# Patient Record
Sex: FEMALE | Race: WHITE | Employment: UNEMPLOYED | URBAN - NONMETROPOLITAN AREA
[De-identification: names, ages, dates, MRNs, and addresses within clinical notes are randomized per-mention and may not be internally consistent; named-entity substitution may affect disease eponyms.]

---

## 2018-06-05 ENCOUNTER — HOSPITAL ENCOUNTER (OUTPATIENT)
Dept: WOMENS IMAGING | Age: 73
Discharge: HOME | End: 2018-06-05
Payer: MEDICARE

## 2018-06-05 DIAGNOSIS — R92.8 ABNORMAL MAMMOGRAM: ICD-10-CM

## 2018-06-05 PROCEDURE — 77063 BREAST TOMOSYNTHESIS BI: CPT

## 2019-06-06 ENCOUNTER — HOSPITAL ENCOUNTER (OUTPATIENT)
Dept: WOMENS IMAGING | Age: 74
Discharge: HOME | End: 2019-06-06
Payer: MEDICARE

## 2019-06-06 DIAGNOSIS — Z12.31 VISIT FOR SCREENING MAMMOGRAM: ICD-10-CM

## 2019-06-06 PROCEDURE — 77063 BREAST TOMOSYNTHESIS BI: CPT

## 2019-08-29 LAB — C-REACTIVE PROTEIN: 0.3

## 2019-09-20 LAB
BUN BLDV-MCNC: 28 MG/DL
CALCIUM SERPL-MCNC: 10.5 MG/DL
CHLORIDE BLD-SCNC: 106 MMOL/L
CO2: 26 MMOL/L
CREAT SERPL-MCNC: 1.34 MG/DL
GFR CALCULATED: 39
GLUCOSE BLD-MCNC: 110 MG/DL
MAGNESIUM: 1.7 MG/DL
POTASSIUM SERPL-SCNC: 4.2 MMOL/L
SODIUM BLD-SCNC: 141 MMOL/L

## 2019-10-21 LAB
AVERAGE GLUCOSE: 140
CREATININE, URINE: 172
HBA1C MFR BLD: 6.5 %
MICROALBUMIN/CREAT 24H UR: 51.4 MG/G{CREAT}
MICROALBUMIN/CREAT UR-RTO: 30

## 2020-11-10 LAB
CREATININE, URINE: 263
MICROALBUMIN/CREAT 24H UR: 37.6 MG/G{CREAT}
MICROALBUMIN/CREAT UR-RTO: 14

## 2020-11-18 LAB
ALP BLD-CCNC: 2.5 U/L
AVERAGE GLUCOSE: NORMAL
BASOPHILS ABSOLUTE: NORMAL
BASOPHILS RELATIVE PERCENT: NORMAL
EOSINOPHILS ABSOLUTE: NORMAL
EOSINOPHILS RELATIVE PERCENT: NORMAL
HBA1C MFR BLD: 7.2 %
HCT VFR BLD CALC: 42 % (ref 36–46)
HEMOGLOBIN: 13.5 G/DL (ref 12–16)
LYMPHOCYTES ABSOLUTE: NORMAL
LYMPHOCYTES RELATIVE PERCENT: NORMAL
MCH RBC QN AUTO: NORMAL PG
MCHC RBC AUTO-ENTMCNC: NORMAL G/DL
MCV RBC AUTO: NORMAL FL
MONOCYTES ABSOLUTE: NORMAL
MONOCYTES RELATIVE PERCENT: NORMAL
NEUTROPHILS ABSOLUTE: NORMAL
NEUTROPHILS RELATIVE PERCENT: NORMAL
PLATELET # BLD: 258 K/ΜL
PMV BLD AUTO: NORMAL FL
PTH INTACT: 137
RBC # BLD: 4.82 10^6/ΜL
TSH SERPL DL<=0.05 MIU/L-ACNC: 0.76 UIU/ML
WBC # BLD: 6.1 10^3/ML

## 2020-11-24 LAB
CALCIUM SERPL-MCNC: 5.6 MG/DL
VITAMIN D 25-HYDROXY: 36
VITAMIN D2, 25 HYDROXY: NORMAL
VITAMIN D3,25 HYDROXY: NORMAL

## 2020-12-08 LAB
BUN BLDV-MCNC: 23 MG/DL
CALCIUM SERPL-MCNC: 9.9 MG/DL
CHLORIDE BLD-SCNC: 106 MMOL/L
CO2: 26 MMOL/L
CREAT SERPL-MCNC: 1.59 MG/DL
GFR CALCULATED: 32
GLUCOSE BLD-MCNC: 105 MG/DL
POTASSIUM SERPL-SCNC: 4.5 MMOL/L
SODIUM BLD-SCNC: 143 MMOL/L
VITAMIN D 25-HYDROXY: 63
VITAMIN D2, 25 HYDROXY: <8
VITAMIN D3,25 HYDROXY: 63

## 2021-01-21 PROBLEM — E11.9 DIABETES MELLITUS TYPE II, NON INSULIN DEPENDENT (HCC): Status: ACTIVE | Noted: 2021-01-21

## 2021-01-21 PROBLEM — E66.9 OBESITY, CLASS II, BMI 35-39.9: Status: ACTIVE | Noted: 2018-12-13

## 2021-01-21 PROBLEM — S46.012A ROTATOR CUFF STRAIN, LEFT, INITIAL ENCOUNTER: Status: ACTIVE | Noted: 2020-04-13

## 2021-01-21 PROBLEM — K21.9 GERD (GASTROESOPHAGEAL REFLUX DISEASE): Status: ACTIVE | Noted: 2021-01-21

## 2021-01-21 PROBLEM — N17.9 AKI (ACUTE KIDNEY INJURY) (HCC): Status: ACTIVE | Noted: 2019-04-08

## 2021-01-21 PROBLEM — E78.2 MIXED HYPERLIPIDEMIA: Status: ACTIVE | Noted: 2017-04-29

## 2021-01-21 PROBLEM — I49.9 ARRHYTHMIA: Status: ACTIVE | Noted: 2021-01-21

## 2021-01-21 PROBLEM — G47.00 INSOMNIA: Status: ACTIVE | Noted: 2021-01-21

## 2021-01-21 PROBLEM — E66.812 OBESITY, CLASS II, BMI 35-39.9: Status: ACTIVE | Noted: 2018-12-13

## 2021-01-21 PROBLEM — I10 ESSENTIAL HYPERTENSION, BENIGN: Status: ACTIVE | Noted: 2017-04-29

## 2021-01-21 PROBLEM — L30.9 ECZEMA OF BOTH HANDS: Status: ACTIVE | Noted: 2018-09-20

## 2021-01-21 PROBLEM — M19.90 OSTEOARTHROSIS: Status: ACTIVE | Noted: 2021-01-21

## 2021-01-21 PROBLEM — N28.9 RENAL IMPAIRMENT: Status: ACTIVE | Noted: 2021-01-21

## 2021-01-21 PROBLEM — F32.A DEPRESSION: Status: ACTIVE | Noted: 2018-03-27

## 2021-01-21 PROBLEM — J45.30 MILD PERSISTENT ASTHMA WITHOUT COMPLICATION: Status: ACTIVE | Noted: 2017-04-29

## 2021-01-21 RX ORDER — MONTELUKAST SODIUM 10 MG/1
TABLET ORAL
COMMUNITY
Start: 2020-11-10 | End: 2021-01-26 | Stop reason: SDUPTHER

## 2021-01-21 RX ORDER — ROSUVASTATIN CALCIUM 5 MG/1
TABLET, COATED ORAL
COMMUNITY
Start: 2020-11-10 | End: 2021-01-26 | Stop reason: SDUPTHER

## 2021-01-21 RX ORDER — OMEPRAZOLE 40 MG/1
40 CAPSULE, DELAYED RELEASE ORAL EVERY MORNING
COMMUNITY
Start: 2020-06-11

## 2021-01-21 RX ORDER — SENNOSIDES 8.6 MG
650 CAPSULE ORAL 2 TIMES DAILY
COMMUNITY

## 2021-01-21 RX ORDER — CHOLECALCIFEROL (VITAMIN D3) 125 MCG
10 CAPSULE ORAL DAILY
COMMUNITY

## 2021-01-21 RX ORDER — FLUOXETINE HYDROCHLORIDE 20 MG/1
CAPSULE ORAL
COMMUNITY
Start: 2020-11-10 | End: 2021-01-26 | Stop reason: SDUPTHER

## 2021-01-21 RX ORDER — LORATADINE 10 MG/1
10 TABLET ORAL DAILY
COMMUNITY

## 2021-01-21 RX ORDER — MONTELUKAST SODIUM 10 MG/1
10 TABLET ORAL DAILY
COMMUNITY
Start: 2020-11-10

## 2021-01-21 RX ORDER — ROSUVASTATIN CALCIUM 5 MG/1
5 TABLET, COATED ORAL DAILY
COMMUNITY
Start: 2020-11-10

## 2021-01-21 RX ORDER — ASPIRIN 81 MG/1
81 TABLET ORAL DAILY
COMMUNITY

## 2021-01-21 RX ORDER — METOPROLOL TARTRATE 50 MG/1
50 TABLET, FILM COATED ORAL 2 TIMES DAILY
COMMUNITY
Start: 2020-11-03

## 2021-01-21 RX ORDER — FLUOXETINE HYDROCHLORIDE 20 MG/1
20 CAPSULE ORAL DAILY
COMMUNITY
Start: 2020-11-10

## 2021-01-21 RX ORDER — MELOXICAM 7.5 MG/1
7.5 TABLET ORAL DAILY
COMMUNITY
Start: 2020-04-07 | End: 2021-04-27 | Stop reason: ALTCHOICE

## 2021-01-26 ENCOUNTER — OFFICE VISIT (OUTPATIENT)
Dept: NEPHROLOGY | Age: 76
End: 2021-01-26
Payer: MEDICARE

## 2021-01-26 VITALS
SYSTOLIC BLOOD PRESSURE: 125 MMHG | OXYGEN SATURATION: 97 % | WEIGHT: 192 LBS | DIASTOLIC BLOOD PRESSURE: 69 MMHG | HEART RATE: 80 BPM | BODY MASS INDEX: 32.78 KG/M2 | HEIGHT: 64 IN

## 2021-01-26 DIAGNOSIS — E83.52 HYPERCALCEMIA: ICD-10-CM

## 2021-01-26 DIAGNOSIS — N18.32 STAGE 3B CHRONIC KIDNEY DISEASE (HCC): Primary | ICD-10-CM

## 2021-01-26 PROCEDURE — 99204 OFFICE O/P NEW MOD 45 MIN: CPT | Performed by: INTERNAL MEDICINE

## 2021-01-26 NOTE — PATIENT INSTRUCTIONS
You can try over the counter Pepcid 20 mg daily in place of Omeprazole for heartburn. Limit use of Meloxicam and other NSAIDS (Motrin, Advil, Aleve, Ibuprofen, Naproxyn). Take Tylenol for pain. Avoid calcium supplements and Vitamin D. Increase water intake to at least 4 glasses, more if you can. Obtain 24 hour urine collection  Kidney ultrasound  Nuclear scan of parathyroid glands.

## 2021-01-26 NOTE — PROGRESS NOTES
activity: Not on file   Lifestyle    Physical activity     Days per week: Not on file     Minutes per session: Not on file    Stress: Not on file   Relationships    Social connections     Talks on phone: Not on file     Gets together: Not on file     Attends Rastafarian service: Not on file     Active member of club or organization: Not on file     Attends meetings of clubs or organizations: Not on file     Relationship status: Not on file    Intimate partner violence     Fear of current or ex partner: Not on file     Emotionally abused: Not on file     Physically abused: Not on file     Forced sexual activity: Not on file   Other Topics Concern    Not on file   Social History Narrative    Not on file        Review of Systems:  Constitutional: no fever or chills +fatigue +hot flashes  Head: No headaches  Eyes: no blurry vision, no discharge  Ears: no ear pain or hearing changes  Nose: no runny nose or epistaxis  Respiratory: no shortness of breath or cough or sputum production  Cardiovascular: no chest pain, no edema  GI: no nausea, no vomiting or diarrhea +heartburn  : denies any hematuria, no flank pain  Skin: no rash  Musculoskeletal: no joint pain, moves all ext  Neuro: no tremor, no slurred speech  Psychiatric: stable mood, no depression or insomnia     Home Medications:  Prior to Admission medications    Medication Sig Start Date End Date Taking?  Authorizing Provider   Cyanocobalamin 100 MCG LOZG Take 5,000 mcg by mouth daily   Yes Historical Provider, MD   acetaminophen (TYLENOL) 650 MG extended release tablet Take 650 mg by mouth 2 times daily   Yes Historical Provider, MD   aspirin 81 MG EC tablet Take 81 mg by mouth daily   Yes Historical Provider, MD   diphenhydrAMINE (SOMINEX) 25 MG tablet Take 25 mg by mouth every 6 hours as needed   Yes Historical Provider, MD   FLUoxetine (PROZAC) 20 MG capsule Take 20 mg by mouth daily 11/10/20  Yes Historical Provider, MD   loratadine (CLARITIN) 10 MG tablet Results   Component Value Date    WBC 6.1 11/18/2020    HGB 13.5 11/18/2020    HCT 42.0 11/18/2020     11/18/2020     BMP:    Lab Results   Component Value Date     12/08/2020     09/20/2019    K 4.5 12/08/2020    K 4.2 09/20/2019     12/08/2020     09/20/2019    CO2 26 12/08/2020    CO2 26 09/20/2019    BUN 23 12/08/2020    BUN 28 09/20/2019    CREATININE 1.59 12/08/2020    CREATININE 1.34 09/20/2019    GLUCOSE 105 12/08/2020    GLUCOSE 110 09/20/2019      Hepatic:   Lab Results   Component Value Date    ALKPHOS 2.5 11/18/2020     BNP: No results found for: BNP  Lipids: No results found for: CHOL, HDL  INR: No results found for: INR  URINE: No results found for: NAUR, PROTUR  No results found for: NITRU, COLORU, PHUR, LABCAST, WBCUA, RBCUA, MUCUS, TRICHOMONAS, YEAST, BACTERIA, CLARITYU, SPECGRAV, LEUKOCYTESUR, UROBILINOGEN, BILIRUBINUR, BLOODU, GLUCOSEU, KETUA, AMORPHOUS   Microalbumen/Creatinine ratio:  No components found for: RUCREAT        Impression/Plan:   1. CKD III likely from DM, NSAIDs, hypercalcemia +/- PPI. Obtain renal US. No significant proteinuria. Advised pepcid instead of PPI If able. Limit NSAIDs but doesn't take regularly. Increase water intake. 2. Hypercalcemia suspect primary hyperparathyroidism. Will obtain 24 hour urine calcium as well as nuclear parathyroid scan. Avoid Ca/Vit D. Last Ca was 9.9 so we will just monitor for now but if worsens will consider surgery. 3. DM  4. HTN  5. GERD    Return in 3 months. Thought process was discussed with the patient.   Thank you for the referral.  Please do not hesitate to contact me if you have any questions or concerns        Dora Page, DO  [facility]

## 2021-01-29 LAB
CALCIUM 24 HOUR URINE: 215
CREATINE 24 HOUR URINE: 0.9
CREATININE 24 HOUR UR: NORMAL
SODIUM 24 HOUR URINE: 168
URINE VOLUME, 24 HOUR: NORMAL

## 2021-04-20 LAB
BUN BLDV-MCNC: 23 MG/DL
CALCIUM SERPL-MCNC: 10.6 MG/DL
CHLORIDE BLD-SCNC: 109 MMOL/L
CO2: 25 MMOL/L
CREAT SERPL-MCNC: 1.2 MG/DL
GFR CALCULATED: 44
GLUCOSE BLD-MCNC: 127 MG/DL
POTASSIUM SERPL-SCNC: 4.6 MMOL/L
PTH INTACT: 141
SODIUM BLD-SCNC: 141 MMOL/L

## 2021-04-27 ENCOUNTER — OFFICE VISIT (OUTPATIENT)
Dept: NEPHROLOGY | Age: 76
End: 2021-04-27
Payer: MEDICARE

## 2021-04-27 VITALS
DIASTOLIC BLOOD PRESSURE: 56 MMHG | SYSTOLIC BLOOD PRESSURE: 117 MMHG | OXYGEN SATURATION: 98 % | HEART RATE: 83 BPM | WEIGHT: 187 LBS | BODY MASS INDEX: 32.1 KG/M2

## 2021-04-27 DIAGNOSIS — E21.3 HYPERPARATHYROIDISM (HCC): ICD-10-CM

## 2021-04-27 DIAGNOSIS — N18.32 STAGE 3B CHRONIC KIDNEY DISEASE (HCC): Primary | ICD-10-CM

## 2021-04-27 DIAGNOSIS — E83.52 HYPERCALCEMIA: ICD-10-CM

## 2021-04-27 PROCEDURE — 99213 OFFICE O/P EST LOW 20 MIN: CPT | Performed by: INTERNAL MEDICINE

## 2021-04-27 NOTE — PROGRESS NOTES
Patient will call office with updated medication list some things have changed.
mg by mouth 2 times daily      aspirin 81 MG EC tablet Take 81 mg by mouth daily      diphenhydrAMINE (SOMINEX) 25 MG tablet Take 25 mg by mouth every 6 hours as needed      FLUoxetine (PROZAC) 20 MG capsule Take 20 mg by mouth daily      loratadine (CLARITIN) 10 MG tablet Take 10 mg by mouth daily      melatonin 5 MG TABS tablet Take 10 mg by mouth daily       metFORMIN (GLUCOPHAGE) 500 MG tablet Take 500 mg by mouth 2 times daily      metoprolol tartrate (LOPRESSOR) 50 MG tablet       mometasone-formoterol (DULERA) 100-5 MCG/ACT inhaler Inhale 2 puffs into the lungs 2 times daily      DULERA 100-5 MCG/ACT inhaler       montelukast (SINGULAIR) 10 MG tablet Take 10 mg by mouth daily      omeprazole (PRILOSEC) 40 MG delayed release capsule Take 40 mg by mouth every morning      rosuvastatin (CRESTOR) 5 MG tablet Take 5 mg by mouth daily       No current facility-administered medications for this visit.          Laboratory & Diagnostics:  CBC:   Lab Results   Component Value Date    WBC 6.1 11/18/2020    HGB 13.5 11/18/2020    HCT 42.0 11/18/2020     11/18/2020     BMP:    Lab Results   Component Value Date     04/20/2021     12/08/2020     09/20/2019    K 4.6 04/20/2021    K 4.5 12/08/2020    K 4.2 09/20/2019     04/20/2021     12/08/2020     09/20/2019    CO2 25 04/20/2021    CO2 26 12/08/2020    CO2 26 09/20/2019    BUN 23 04/20/2021    BUN 23 12/08/2020    BUN 28 09/20/2019    CREATININE 1.20 04/20/2021    CREATININE 1.59 12/08/2020    CREATININE 1.34 09/20/2019    GLUCOSE 127 04/20/2021    GLUCOSE 105 12/08/2020    GLUCOSE 110 09/20/2019      Hepatic:   Lab Results   Component Value Date    ALKPHOS 2.5 11/18/2020     BNP: No results found for: BNP  Lipids: No results found for: CHOL, HDL  INR: No results found for: INR  URINE: No results found for: NAUR, PROTUR  No results found for: NITRU, COLORU, PHUR, LABCAST, WBCUA, RBCUA, MUCUS, TRICHOMONAS, YEAST,

## 2021-05-07 ENCOUNTER — HOSPITAL ENCOUNTER (OUTPATIENT)
Dept: WOMENS IMAGING | Age: 76
Discharge: HOME | End: 2021-05-07
Payer: MEDICARE

## 2021-05-07 DIAGNOSIS — Z12.31 VISIT FOR SCREENING MAMMOGRAM: ICD-10-CM

## 2021-05-07 PROCEDURE — 77063 BREAST TOMOSYNTHESIS BI: CPT

## 2021-10-21 LAB — PTH INTACT: 112

## 2021-10-26 ENCOUNTER — OFFICE VISIT (OUTPATIENT)
Dept: NEPHROLOGY | Age: 76
End: 2021-10-26
Payer: MEDICARE

## 2021-10-26 VITALS
OXYGEN SATURATION: 97 % | SYSTOLIC BLOOD PRESSURE: 110 MMHG | BODY MASS INDEX: 32.44 KG/M2 | HEART RATE: 92 BPM | DIASTOLIC BLOOD PRESSURE: 67 MMHG | WEIGHT: 189 LBS

## 2021-10-26 DIAGNOSIS — N18.32 STAGE 3B CHRONIC KIDNEY DISEASE (HCC): Primary | ICD-10-CM

## 2021-10-26 PROCEDURE — 99214 OFFICE O/P EST MOD 30 MIN: CPT | Performed by: INTERNAL MEDICINE

## 2021-10-26 RX ORDER — LOSARTAN POTASSIUM 25 MG/1
25 TABLET ORAL DAILY
Qty: 90 TABLET | Refills: 3 | Status: SHIPPED | OUTPATIENT
Start: 2021-10-26 | End: 2022-10-10 | Stop reason: SDUPTHER

## 2021-10-26 RX ORDER — FLUCONAZOLE 150 MG/1
150 TABLET ORAL ONCE
Qty: 1 TABLET | Refills: 0 | Status: SHIPPED | OUTPATIENT
Start: 2021-10-26 | End: 2021-10-26

## 2021-10-26 NOTE — PROGRESS NOTES
[facility]  [address] [facility] [address]  Dept: [phone]  Loc: [phone]  Progress Note  10/26/2021 12:26 PM      Pt Name:    Michael Munoz  YOB: 1945  Primary Care Physician:  Melissa Gomez DO     Chief Complaint:   Chief Complaint   Patient presents with    Follow-up     CKD III        History of Present Illness: This is a follow-up visit for CKD III. Hx of DM > 10 years, hypercalcemia, asthma, JACIEL, hyperlipidemia, depression, GERD, calcium oxalate kidney stones. She has hx of THOMAS in the past in Apirl 2019 from sepsis and obstructive uropathy. Previous work up showed PTH high, 24 hour urine calcium is 215 mg. Parathyroid nuclear med scan was negative. Renal US showed atrophic left kidney. Patient doing well. No complaints. BP controlled. No edema. Sugars controlled. She has some vaginal itching that is bothering her. Pertinent items are noted in HPI. Past History:  No past medical history on file. No past surgical history on file. VITALS:  /67 (Site: Right Upper Arm, Position: Sitting, Cuff Size: Large Adult)   Pulse 92   Wt 189 lb (85.7 kg)   SpO2 97%   BMI 32.44 kg/m²   Wt Readings from Last 3 Encounters:   10/26/21 189 lb (85.7 kg)   04/27/21 187 lb (84.8 kg)   01/26/21 192 lb (87.1 kg)     Body mass index is 32.44 kg/m².      General Appearance: alert and cooperative with exam, appears comfortable, no distress  HEENT: EOMI, moist oral mucus membranes  Neck: No jugular venous distention,   Lungs: Air entry B/L, no crackles or rales, no use of accessory muscles  Heart: S1, S2 heard, no rub  GI: soft, non-tender, no guarding,   Extremities: No sig LE edema, no cyanosis  Skin: warm, dry  Neurologic: no tremor, no asterixis, no focal neurologic deficits     Medications:  Current Outpatient Medications   Medication Sig Dispense Refill    Apoaequorin (PREVAGEN) 10 MG CAPS Take by mouth      Cyanocobalamin 100 MCG LOZG Take 5,000 mcg by mouth daily      acetaminophen (TYLENOL) 650 MG extended release tablet Take 650 mg by mouth 2 times daily      aspirin 81 MG EC tablet Take 81 mg by mouth daily      diphenhydrAMINE (SOMINEX) 25 MG tablet Take 25 mg by mouth every 6 hours as needed      FLUoxetine (PROZAC) 20 MG capsule Take 20 mg by mouth daily      loratadine (CLARITIN) 10 MG tablet Take 10 mg by mouth daily      melatonin 5 MG TABS tablet Take 10 mg by mouth daily       metFORMIN (GLUCOPHAGE) 500 MG tablet Take 500 mg by mouth 2 times daily      metoprolol tartrate (LOPRESSOR) 50 MG tablet       mometasone-formoterol (DULERA) 100-5 MCG/ACT inhaler Inhale 2 puffs into the lungs 2 times daily      DULERA 100-5 MCG/ACT inhaler       montelukast (SINGULAIR) 10 MG tablet Take 10 mg by mouth daily      omeprazole (PRILOSEC) 40 MG delayed release capsule Take 40 mg by mouth every morning      rosuvastatin (CRESTOR) 5 MG tablet Take 5 mg by mouth daily       No current facility-administered medications for this visit.         Laboratory & Diagnostics:  CBC:   Lab Results   Component Value Date    WBC 6.1 11/18/2020    HGB 13.5 11/18/2020    HCT 42.0 11/18/2020     11/18/2020     BMP:    Lab Results   Component Value Date     04/20/2021     12/08/2020     09/20/2019    K 4.6 04/20/2021    K 4.5 12/08/2020    K 4.2 09/20/2019     04/20/2021     12/08/2020     09/20/2019    CO2 25 04/20/2021    CO2 26 12/08/2020    CO2 26 09/20/2019    BUN 23 04/20/2021    BUN 23 12/08/2020    BUN 28 09/20/2019    CREATININE 1.20 04/20/2021    CREATININE 1.59 12/08/2020    CREATININE 1.34 09/20/2019    GLUCOSE 127 04/20/2021    GLUCOSE 105 12/08/2020    GLUCOSE 110 09/20/2019      Hepatic:   Lab Results   Component Value Date    ALKPHOS 2.5 11/18/2020     BNP: No results found for: BNP  Lipids: No results found for: CHOL, HDL  INR: No results found for: INR  URINE: No results found for: NAUR, PROTUR  No results found for: NITRU, COLORU, PHUR, LABCAST, WBCUA, RBCUA, MUCUS, TRICHOMONAS, YEAST, BACTERIA, CLARITYU, SPECGRAV, LEUKOCYTESUR, UROBILINOGEN, BILIRUBINUR, BLOODU, GLUCOSEU, KETUA, AMORPHOUS   Microalbumen/Creatinine ratio:  No components found for: RUCREAT        Impression/Plan:   1. CKD III from diabetic kidney disease, hypertensive nephrosclerosis in atrophic kidney: overall stabe. Goals of care include slowing rate of progression by controlling blood pressure, blood glucoses and albuminuria and by avoiding nephrotoxins such as NSAIDs and IV contrast.   2. Hypercalcemia likely primary hyperparathyroidism. 24 hour urine calcium was borderline in 200s. Hypercalcemia is mild so we will just continue to monitor. No indication for surgery at this time. Avoid calcium supplements  -NM scan was negative for parathyroid adenoma but can see frequent false negatives with it    3. DM,, + microalbuminuria. Start losartan 25 mg daily. Repeat bmp 2 weeks  4. HTN  5. Atrophic left kidney  6. Probable yeast infection: Rx Diflucan      Bloodwork and medications were reviewed and plan of care discussed with the patient. Return to clinic in 1 year  or sooner if the need arises.       Yocasta Stevens DO  [facility]

## 2022-10-06 LAB — PTH INTACT: 118

## 2022-10-10 ENCOUNTER — OFFICE VISIT (OUTPATIENT)
Dept: NEPHROLOGY | Age: 77
End: 2022-10-10
Payer: MEDICARE

## 2022-10-10 VITALS
BODY MASS INDEX: 32.32 KG/M2 | DIASTOLIC BLOOD PRESSURE: 56 MMHG | WEIGHT: 194 LBS | HEIGHT: 65 IN | OXYGEN SATURATION: 98 % | HEART RATE: 88 BPM | SYSTOLIC BLOOD PRESSURE: 117 MMHG

## 2022-10-10 DIAGNOSIS — E83.52 HYPERCALCEMIA: Primary | ICD-10-CM

## 2022-10-10 DIAGNOSIS — N18.31 STAGE 3A CHRONIC KIDNEY DISEASE (HCC): ICD-10-CM

## 2022-10-10 DIAGNOSIS — E21.3 HYPERPARATHYROIDISM (HCC): ICD-10-CM

## 2022-10-10 PROCEDURE — 99213 OFFICE O/P EST LOW 20 MIN: CPT | Performed by: INTERNAL MEDICINE

## 2022-10-10 PROCEDURE — 1123F ACP DISCUSS/DSCN MKR DOCD: CPT | Performed by: INTERNAL MEDICINE

## 2022-10-10 RX ORDER — LANOLIN ALCOHOL/MO/W.PET/CERES
400 CREAM (GRAM) TOPICAL DAILY
COMMUNITY

## 2022-10-10 RX ORDER — NICOTINE POLACRILEX 2 MG
GUM BUCCAL
COMMUNITY

## 2022-10-10 RX ORDER — TIZANIDINE 2 MG/1
2 TABLET ORAL 2 TIMES DAILY PRN
COMMUNITY
Start: 2022-05-26

## 2022-10-10 RX ORDER — LOSARTAN POTASSIUM 25 MG/1
25 TABLET ORAL DAILY
Qty: 90 TABLET | Refills: 3 | Status: SHIPPED | OUTPATIENT
Start: 2022-10-10

## 2022-10-10 RX ORDER — MAGNESIUM OXIDE 400 MG/1
400 TABLET ORAL DAILY
COMMUNITY

## 2022-10-10 NOTE — PROGRESS NOTES
mouth daily      folic acid (FOLVITE) 642 MCG tablet Take 400 mcg by mouth daily      Biotin 1 MG CAPS Take by mouth      tiZANidine (ZANAFLEX) 2 MG tablet Take 2 mg by mouth 2 times daily as needed      Apoaequorin (PREVAGEN) 10 MG CAPS Take by mouth      losartan (COZAAR) 25 MG tablet Take 1 tablet by mouth daily 90 tablet 3    Cyanocobalamin 100 MCG LOZG Take 5,000 mcg by mouth daily      acetaminophen (TYLENOL) 650 MG extended release tablet Take 650 mg by mouth 2 times daily      aspirin 81 MG EC tablet Take 81 mg by mouth daily      diphenhydrAMINE (SOMINEX) 25 MG tablet Take 25 mg by mouth every 6 hours as needed      FLUoxetine (PROZAC) 20 MG capsule Take 20 mg by mouth daily      loratadine (CLARITIN) 10 MG tablet Take 10 mg by mouth daily      melatonin 5 MG TABS tablet Take 10 mg by mouth daily       metFORMIN (GLUCOPHAGE) 500 MG tablet Take 500 mg by mouth 2 times daily      metoprolol tartrate (LOPRESSOR) 50 MG tablet Take 50 mg by mouth 2 times daily      mometasone-formoterol (DULERA) 100-5 MCG/ACT inhaler Inhale 2 puffs into the lungs 2 times daily      DULERA 100-5 MCG/ACT inhaler       montelukast (SINGULAIR) 10 MG tablet Take 10 mg by mouth daily      omeprazole (PRILOSEC) 40 MG delayed release capsule Take 40 mg by mouth every morning      rosuvastatin (CRESTOR) 5 MG tablet Take 5 mg by mouth daily       No current facility-administered medications for this visit.         Laboratory & Diagnostics:  CBC:   Lab Results   Component Value Date    WBC 6.1 11/18/2020    HGB 13.5 11/18/2020    HCT 42.0 11/18/2020     11/18/2020     BMP:    Lab Results   Component Value Date     04/20/2021     12/08/2020     09/20/2019    K 4.6 04/20/2021    K 4.5 12/08/2020    K 4.2 09/20/2019     04/20/2021     12/08/2020     09/20/2019    CO2 25 04/20/2021    CO2 26 12/08/2020    CO2 26 09/20/2019    BUN 23 04/20/2021    BUN 23 12/08/2020    BUN 28 09/20/2019    CREATININE 1.20 04/20/2021    CREATININE 1.59 12/08/2020    CREATININE 1.34 09/20/2019    GLUCOSE 127 04/20/2021    GLUCOSE 105 12/08/2020    GLUCOSE 110 09/20/2019      Hepatic:   Lab Results   Component Value Date    ALKPHOS 2.5 11/18/2020     BNP: No results found for: BNP  Lipids: No results found for: CHOL, HDL  INR: No results found for: INR  URINE: No results found for: NAUR, PROTUR  No results found for: NITRU, COLORU, PHUR, LABCAST, WBCUA, RBCUA, MUCUS, TRICHOMONAS, YEAST, BACTERIA, CLARITYU, SPECGRAV, LEUKOCYTESUR, UROBILINOGEN, BILIRUBINUR, BLOODU, GLUCOSEU, KETUA, AMORPHOUS   Microalbumen/Creatinine ratio:  No components found for: RUCREAT        Impression/Plan:   1. CKD III from diabetic kidney disease, hypertensive nephrosclerosis in atrophic kidney: overall stable. Goals of care include slowing rate of progression by controlling blood pressure, blood glucoses and albuminuria and by avoiding nephrotoxins such as NSAIDs and IV contrast.   2. Hypercalcemia likely primary hyperparathyroidism. 24 hour urine calcium was borderline in 200s. Hypercalcemia is mild so we will just continue to monitor. No indication for surgery at this time    -NM scan was negative for parathyroid adenoma but can see frequent false negatives with it    3. DM, mild proteinuria, continue ARB  4. HTN  5. Atrophic left kidney        Bloodwork and medications were reviewed and plan of care discussed with the patient. Return to clinic in 1 year  or sooner if the need arises.       Brittney Grove,   [facility]

## 2023-10-04 LAB
VITAMIN D 25-HYDROXY: 56.4
VITAMIN D2, 25 HYDROXY: NORMAL
VITAMIN D3,25 HYDROXY: NORMAL

## 2023-10-09 ENCOUNTER — OFFICE VISIT (OUTPATIENT)
Dept: NEPHROLOGY | Age: 78
End: 2023-10-09
Payer: MEDICARE

## 2023-10-09 VITALS
HEART RATE: 77 BPM | BODY MASS INDEX: 32.95 KG/M2 | DIASTOLIC BLOOD PRESSURE: 64 MMHG | OXYGEN SATURATION: 97 % | SYSTOLIC BLOOD PRESSURE: 117 MMHG | WEIGHT: 195 LBS

## 2023-10-09 DIAGNOSIS — E83.52 HYPERCALCEMIA: ICD-10-CM

## 2023-10-09 DIAGNOSIS — E87.5 HYPERKALEMIA: ICD-10-CM

## 2023-10-09 DIAGNOSIS — N18.31 STAGE 3A CHRONIC KIDNEY DISEASE (HCC): Primary | ICD-10-CM

## 2023-10-09 PROCEDURE — 3078F DIAST BP <80 MM HG: CPT | Performed by: INTERNAL MEDICINE

## 2023-10-09 PROCEDURE — 1123F ACP DISCUSS/DSCN MKR DOCD: CPT | Performed by: INTERNAL MEDICINE

## 2023-10-09 PROCEDURE — 99214 OFFICE O/P EST MOD 30 MIN: CPT | Performed by: INTERNAL MEDICINE

## 2023-10-09 PROCEDURE — 3074F SYST BP LT 130 MM HG: CPT | Performed by: INTERNAL MEDICINE

## 2023-10-09 RX ORDER — LOSARTAN POTASSIUM 25 MG/1
25 TABLET ORAL DAILY
Qty: 90 TABLET | Refills: 3 | Status: SHIPPED | OUTPATIENT
Start: 2023-10-09

## 2023-10-09 NOTE — PROGRESS NOTES
04/20/2021    CREATININE 1.59 12/08/2020    CREATININE 1.34 09/20/2019    GLUCOSE 127 04/20/2021    GLUCOSE 105 12/08/2020    GLUCOSE 110 09/20/2019      Hepatic:   Lab Results   Component Value Date    ALKPHOS 2.5 11/18/2020     BNP: No results found for: \"BNP\"  Lipids: No results found for: \"CHOL\", \"HDL\"  INR: No results found for: \"INR\"  URINE: No results found for: \"NAUR\", \"PROTUR\"  No results found for: \"NITRU\", \"COLORU\", \"PHUR\", \"LABCAST\", \"WBCUA\", \"RBCUA\", \"MUCUS\", \"TRICHOMONAS\", \"YEAST\", \"BACTERIA\", \"CLARITYU\", \"SPECGRAV\", \"LEUKOCYTESUR\", \"UROBILINOGEN\", \"BILIRUBINUR\", \"BLOODU\", \"GLUCOSEU\", \"KETUA\", \"AMORPHOUS\"   Microalbumen/Creatinine ratio:  No components found for: \"RUCREAT\"        Impression/Plan:   1. CKD III from diabetic kidney disease, hypertensive nephrosclerosis in atrophic kidney: overall stable. Goals of care include slowing rate of progression by controlling blood pressure, blood glucoses and albuminuria and by avoiding nephrotoxins such as NSAIDs and IV contrast.   2. Hypercalcemia likely primary hyperparathyroidism. 24 hour urine calcium was borderline in 200s. Hypercalcemia is mild so we will just continue to monitor. No indication for surgery at this time    -NM scan was negative for parathyroid adenoma but can see frequent false negatives with it    3. DM,  4. Proteinuria, resolved with ARB  5. Hyperkalemia mild. Discussed low K diet. Repeat K in 2 weeks, ok to continue ARB for now unless worsens  6. HTN  7. Atrophic left kidney        Bloodwork and medications were reviewed and plan of care discussed with the patient. Return to clinic in 1 year  or sooner if the need arises.       Avery Gill,   [facility]

## 2023-11-09 NOTE — TELEPHONE ENCOUNTER
Patient thought she needed a refill for the Losartan I let her know it was sent in in October to [facility]. She said she will give them a call.

## 2024-10-14 ENCOUNTER — OFFICE VISIT (OUTPATIENT)
Dept: NEPHROLOGY | Age: 79
End: 2024-10-14
Payer: MEDICARE

## 2024-10-14 VITALS
WEIGHT: 197 LBS | BODY MASS INDEX: 33.29 KG/M2 | DIASTOLIC BLOOD PRESSURE: 73 MMHG | SYSTOLIC BLOOD PRESSURE: 123 MMHG | OXYGEN SATURATION: 98 % | HEART RATE: 90 BPM

## 2024-10-14 DIAGNOSIS — N18.31 STAGE 3A CHRONIC KIDNEY DISEASE (HCC): Primary | ICD-10-CM

## 2024-10-14 DIAGNOSIS — E83.52 HYPERCALCEMIA: ICD-10-CM

## 2024-10-14 PROCEDURE — 3074F SYST BP LT 130 MM HG: CPT | Performed by: INTERNAL MEDICINE

## 2024-10-14 PROCEDURE — 99214 OFFICE O/P EST MOD 30 MIN: CPT | Performed by: INTERNAL MEDICINE

## 2024-10-14 PROCEDURE — 3078F DIAST BP <80 MM HG: CPT | Performed by: INTERNAL MEDICINE

## 2024-10-14 PROCEDURE — 1123F ACP DISCUSS/DSCN MKR DOCD: CPT | Performed by: INTERNAL MEDICINE

## 2024-10-14 PROCEDURE — G2211 COMPLEX E/M VISIT ADD ON: HCPCS | Performed by: INTERNAL MEDICINE

## 2024-10-14 NOTE — PROGRESS NOTES
[facility]  [facility]  [address], [facility] [address]  Dept: [phone]  Loc: [phone]  Progress Note  10/14/2024 11:04 AM      Pt Name:    Ester Barnes  YOB: 1945  Primary Care Physician:  Anthony Merlos DO     Chief Complaint:   Chief Complaint   Patient presents with    Follow-up     CKD III        History of Present Illness:   This is a follow-up visit for CKD III. Hx of DM > 10 years, hypercalcemia, asthma, JACIEL, hyperlipidemia, depression, GERD, calcium oxalate kidney stones.   She has hx of THOMAS in the past in Apirl 2019 from sepsis and obstructive uropathy.   Previous work up showed PTH high, 24 hour urine calcium was 215 mg.  Parathyroid nuclear med scan was negative.   Renal US showed atrophic left kidney.    Accompanied by her .   Seems to be having some memory issues.   A1C was higher.  Has been eating lots of ice cream bars.        Pertinent items are noted in HPI.         Past History:  No past medical history on file.  No past surgical history on file.     VITALS:  /73 (Site: Left Upper Arm, Position: Sitting, Cuff Size: Large Adult)   Pulse 90   Wt 89.4 kg (197 lb)   SpO2 98%   BMI 33.29 kg/m²   Wt Readings from Last 3 Encounters:   10/14/24 89.4 kg (197 lb)   10/09/23 88.5 kg (195 lb)   10/10/22 88 kg (194 lb)     Body mass index is 33.29 kg/m².     General Appearance: alert and cooperative with exam, appears comfortable, no distress  HEENT: EOMI, moist oral mucus membranes  Neck: No jugular venous distention,   Lungs: Air entry B/L, no crackles or rales, no use of accessory muscles  Heart: S1, S2 heard, no rub  GI: soft, non-tender, no guarding,   Extremities: No sig LE edema, no cyanosis  Skin: warm, dry  Neurologic: no slurred speech     Medications:  Current Outpatient Medications   Medication Sig Dispense Refill    losartan (COZAAR) 25 MG tablet Take 1 tablet by mouth

## 2024-11-12 DIAGNOSIS — E83.52 HYPERCALCEMIA: Primary | ICD-10-CM

## 2024-11-12 RX ORDER — CINACALCET 30 MG/1
30 TABLET, FILM COATED ORAL DAILY
Qty: 30 TABLET | Refills: 3 | Status: SHIPPED | OUTPATIENT
Start: 2024-11-12

## 2024-11-12 NOTE — TELEPHONE ENCOUNTER
Dr. De Oliveira called concerned regarding pt's memory loss is getting worse. She seen that pt has history of hypercalcemia. She is wondering if it is worthwhile to pursing pt's hyperparathyroidism/hypercalcemia. You can reach her at [phone]

## 2024-11-12 NOTE — TELEPHONE ENCOUNTER
Spoke to Don and he is willing to try cinacalcet script pending and will mail lab orders to home address.

## 2024-11-19 ENCOUNTER — TELEPHONE (OUTPATIENT)
Dept: NEPHROLOGY | Age: 79
End: 2024-11-19

## 2024-11-19 NOTE — TELEPHONE ENCOUNTER
----- Message from Dr. Roxana Castillo DO sent at 11/18/2024  7:45 PM EST -----  I would like to appeal the denial for sensipar.   Or can provide updated note that she cannot undergo parathyroidectomy due to age and memory loss  ----- Message -----  From: Vita Thompson CMA  Sent: 11/18/2024  12:55 PM EST  To: Roxana Castillo DO; Robyn Blank [facility]

## 2024-12-18 RX ORDER — LOSARTAN POTASSIUM 25 MG/1
25 TABLET ORAL DAILY
Qty: 90 TABLET | Refills: 3 | Status: SHIPPED | OUTPATIENT
Start: 2024-12-18

## 2024-12-30 ENCOUNTER — TELEPHONE (OUTPATIENT)
Dept: NEPHROLOGY | Age: 79
End: 2024-12-30

## 2024-12-30 NOTE — TELEPHONE ENCOUNTER
Patient is taking with supper which is her biggest meal. Advised to go to every other day. Don voiced understanding . MAR updated.

## 2024-12-30 NOTE — TELEPHONE ENCOUNTER
Don called with concerns about Ester Mooney. Her appetite has decreased since starting the cinacalcet. He says she just does not feel like eating at all. He feels like she has lost at least 5 lbs. He thinks she is weighing in high 180's now. Evan states they had labs done at Dr. Garcia in [facility] on 12/12/24. I called their office requesting they fax results. Had to leave a message. Please advise.

## 2025-04-14 ENCOUNTER — OFFICE VISIT (OUTPATIENT)
Dept: NEPHROLOGY | Age: 80
End: 2025-04-14
Payer: MEDICARE

## 2025-04-14 VITALS
HEART RATE: 100 BPM | OXYGEN SATURATION: 98 % | SYSTOLIC BLOOD PRESSURE: 116 MMHG | DIASTOLIC BLOOD PRESSURE: 72 MMHG | BODY MASS INDEX: 33.46 KG/M2 | WEIGHT: 198 LBS

## 2025-04-14 DIAGNOSIS — E87.5 HYPERKALEMIA: ICD-10-CM

## 2025-04-14 DIAGNOSIS — N18.31 STAGE 3A CHRONIC KIDNEY DISEASE (HCC): ICD-10-CM

## 2025-04-14 DIAGNOSIS — E83.52 HYPERCALCEMIA: Primary | ICD-10-CM

## 2025-04-14 PROCEDURE — 1159F MED LIST DOCD IN RCRD: CPT | Performed by: INTERNAL MEDICINE

## 2025-04-14 PROCEDURE — 1123F ACP DISCUSS/DSCN MKR DOCD: CPT | Performed by: INTERNAL MEDICINE

## 2025-04-14 PROCEDURE — 3074F SYST BP LT 130 MM HG: CPT | Performed by: INTERNAL MEDICINE

## 2025-04-14 PROCEDURE — 3078F DIAST BP <80 MM HG: CPT | Performed by: INTERNAL MEDICINE

## 2025-04-14 PROCEDURE — 99214 OFFICE O/P EST MOD 30 MIN: CPT | Performed by: INTERNAL MEDICINE

## 2025-04-14 RX ORDER — CINACALCET 30 MG/1
30 TABLET, FILM COATED ORAL EVERY OTHER DAY
COMMUNITY
End: 2025-04-14 | Stop reason: ALTCHOICE

## 2025-04-14 NOTE — PROGRESS NOTES
distention,   Lungs: Air entry B/L, no crackles or rales, no use of accessory muscles  Heart: S1, S2 heard, no rub  GI: soft, non-tender, no guarding,   Extremities: No sig LE edema, no cyanosis  Skin: warm, dry  Neurologic: no slurred speech, pleasantly confused     Medications:  Current Outpatient Medications   Medication Sig Dispense Refill    cinacalcet (SENSIPAR) 30 MG tablet Take 1 tablet by mouth every other day      losartan (COZAAR) 25 MG tablet Take 1 tablet by mouth daily 90 tablet 3    magnesium oxide (MAG-OX) 400 MG tablet Take 1 tablet by mouth daily      Apoaequorin (PREVAGEN) 10 MG CAPS Take by mouth      Cyanocobalamin 100 MCG LOZG Take 5,000 mcg by mouth daily      acetaminophen (TYLENOL) 650 MG extended release tablet Take 1 tablet by mouth 2 times daily      aspirin 81 MG EC tablet Take 1 tablet by mouth daily      diphenhydrAMINE (SOMINEX) 25 MG tablet Take 1 tablet by mouth every 6 hours as needed      FLUoxetine (PROZAC) 20 MG capsule Take 1 capsule by mouth daily      loratadine (CLARITIN) 10 MG tablet Take 1 tablet by mouth daily      melatonin 5 MG TABS tablet Take 2 tablets by mouth daily      metFORMIN (GLUCOPHAGE) 500 MG tablet Take 1 tablet by mouth 2 times daily      metoprolol tartrate (LOPRESSOR) 50 MG tablet Take 1 tablet by mouth 2 times daily      mometasone-formoterol (DULERA) 100-5 MCG/ACT inhaler Inhale 2 puffs into the lungs 2 times daily      DULERA 100-5 MCG/ACT inhaler       montelukast (SINGULAIR) 10 MG tablet Take 1 tablet by mouth daily      omeprazole (PRILOSEC) 40 MG delayed release capsule Take 1 capsule by mouth every morning      rosuvastatin (CRESTOR) 5 MG tablet Take 1 tablet by mouth daily       No current facility-administered medications for this visit.        Laboratory & Diagnostics:  CBC:   Lab Results   Component Value Date    WBC 6.1 11/18/2020    HGB 13.5 11/18/2020    HCT 42.0 11/18/2020     11/18/2020     BMP:    Lab Results   Component Value